# Patient Record
Sex: FEMALE | Race: WHITE | HISPANIC OR LATINO | URBAN - METROPOLITAN AREA
[De-identification: names, ages, dates, MRNs, and addresses within clinical notes are randomized per-mention and may not be internally consistent; named-entity substitution may affect disease eponyms.]

---

## 2024-01-24 VITALS
HEIGHT: 61 IN | SYSTOLIC BLOOD PRESSURE: 106 MMHG | HEART RATE: 84 BPM | WEIGHT: 141.76 LBS | OXYGEN SATURATION: 97 % | TEMPERATURE: 97 F | DIASTOLIC BLOOD PRESSURE: 66 MMHG | RESPIRATION RATE: 16 BRPM

## 2024-01-24 NOTE — ASU PREOP CHECKLIST - BOWEL PREP
How Severe Is Your Skin Lesion?: mild Has Your Skin Lesion Been Treated?: not been treated Is This A New Presentation, Or A Follow-Up?: Skin Lesion n/a

## 2024-01-25 ENCOUNTER — INPATIENT (INPATIENT)
Facility: HOSPITAL | Age: 29
LOS: 0 days | Discharge: HOME | DRG: 743 | End: 2024-01-26
Attending: OBSTETRICS & GYNECOLOGY | Admitting: OBSTETRICS & GYNECOLOGY
Payer: COMMERCIAL

## 2024-01-25 LAB
BLD GP AB SCN SERPL QL: NEGATIVE — SIGNIFICANT CHANGE UP
RH IG SCN BLD-IMP: POSITIVE — SIGNIFICANT CHANGE UP

## 2024-01-25 PROCEDURE — 88304 TISSUE EXAM BY PATHOLOGIST: CPT | Mod: 26

## 2024-01-25 PROCEDURE — 88305 TISSUE EXAM BY PATHOLOGIST: CPT | Mod: 26

## 2024-01-25 RX ORDER — ESCITALOPRAM OXALATE 10 MG/1
1 TABLET, FILM COATED ORAL
Refills: 0 | DISCHARGE

## 2024-01-25 RX ORDER — KETOROLAC TROMETHAMINE 30 MG/ML
30 SYRINGE (ML) INJECTION EVERY 6 HOURS
Refills: 0 | Status: DISCONTINUED | OUTPATIENT
Start: 2024-01-25 | End: 2024-01-26

## 2024-01-25 RX ORDER — ALBUTEROL 90 UG/1
2 AEROSOL, METERED ORAL EVERY 6 HOURS
Refills: 0 | Status: DISCONTINUED | OUTPATIENT
Start: 2024-01-25 | End: 2024-01-26

## 2024-01-25 RX ORDER — OXYCODONE HYDROCHLORIDE 5 MG/1
10 TABLET ORAL EVERY 4 HOURS
Refills: 0 | Status: DISCONTINUED | OUTPATIENT
Start: 2024-01-25 | End: 2024-01-26

## 2024-01-25 RX ORDER — ACETAMINOPHEN 500 MG
1000 TABLET ORAL ONCE
Refills: 0 | Status: COMPLETED | OUTPATIENT
Start: 2024-01-25 | End: 2024-01-25

## 2024-01-25 RX ORDER — SODIUM CHLORIDE 9 MG/ML
1000 INJECTION, SOLUTION INTRAVENOUS
Refills: 0 | Status: DISCONTINUED | OUTPATIENT
Start: 2024-01-25 | End: 2024-01-26

## 2024-01-25 RX ORDER — SIMETHICONE 80 MG/1
80 TABLET, CHEWABLE ORAL EVERY 6 HOURS
Refills: 0 | Status: DISCONTINUED | OUTPATIENT
Start: 2024-01-25 | End: 2024-01-25

## 2024-01-25 RX ORDER — PANTOPRAZOLE SODIUM 20 MG/1
20 TABLET, DELAYED RELEASE ORAL DAILY
Refills: 0 | Status: DISCONTINUED | OUTPATIENT
Start: 2024-01-25 | End: 2024-01-26

## 2024-01-25 RX ORDER — ACETAMINOPHEN 500 MG
1000 TABLET ORAL EVERY 6 HOURS
Refills: 0 | Status: DISCONTINUED | OUTPATIENT
Start: 2024-01-25 | End: 2024-01-26

## 2024-01-25 RX ORDER — METOCLOPRAMIDE HCL 10 MG
10 TABLET ORAL EVERY 8 HOURS
Refills: 0 | Status: DISCONTINUED | OUTPATIENT
Start: 2024-01-25 | End: 2024-01-26

## 2024-01-25 RX ORDER — ONDANSETRON 8 MG/1
8 TABLET, FILM COATED ORAL EVERY 8 HOURS
Refills: 0 | Status: DISCONTINUED | OUTPATIENT
Start: 2024-01-25 | End: 2024-01-26

## 2024-01-25 RX ORDER — TRAZODONE HCL 50 MG
50 TABLET ORAL AT BEDTIME
Refills: 0 | Status: DISCONTINUED | OUTPATIENT
Start: 2024-01-25 | End: 2024-01-26

## 2024-01-25 RX ORDER — SIMETHICONE 80 MG/1
80 TABLET, CHEWABLE ORAL EVERY 6 HOURS
Refills: 0 | Status: DISCONTINUED | OUTPATIENT
Start: 2024-01-25 | End: 2024-01-26

## 2024-01-25 RX ORDER — OXYCODONE HYDROCHLORIDE 5 MG/1
5 TABLET ORAL EVERY 4 HOURS
Refills: 0 | Status: DISCONTINUED | OUTPATIENT
Start: 2024-01-25 | End: 2024-01-26

## 2024-01-25 RX ORDER — ESCITALOPRAM OXALATE 10 MG/1
20 TABLET, FILM COATED ORAL DAILY
Refills: 0 | Status: DISCONTINUED | OUTPATIENT
Start: 2024-01-25 | End: 2024-01-26

## 2024-01-25 RX ORDER — HYDROMORPHONE HYDROCHLORIDE 2 MG/ML
0.2 INJECTION INTRAMUSCULAR; INTRAVENOUS; SUBCUTANEOUS
Refills: 0 | Status: DISCONTINUED | OUTPATIENT
Start: 2024-01-25 | End: 2024-01-25

## 2024-01-25 RX ORDER — SODIUM CHLORIDE 9 MG/ML
1000 INJECTION INTRAMUSCULAR; INTRAVENOUS; SUBCUTANEOUS
Refills: 0 | Status: DISCONTINUED | OUTPATIENT
Start: 2024-01-25 | End: 2024-01-25

## 2024-01-25 RX ADMIN — Medication 1000 MILLIGRAM(S): at 17:54

## 2024-01-25 RX ADMIN — Medication 50 MILLIGRAM(S): at 22:34

## 2024-01-25 RX ADMIN — PANTOPRAZOLE SODIUM 20 MILLIGRAM(S): 20 TABLET, DELAYED RELEASE ORAL at 11:58

## 2024-01-25 RX ADMIN — Medication 30 MILLIGRAM(S): at 22:34

## 2024-01-25 RX ADMIN — Medication 30 MILLIGRAM(S): at 15:46

## 2024-01-25 RX ADMIN — Medication 400 MILLIGRAM(S): at 12:25

## 2024-01-25 RX ADMIN — HYDROMORPHONE HYDROCHLORIDE 0.2 MILLIGRAM(S): 2 INJECTION INTRAMUSCULAR; INTRAVENOUS; SUBCUTANEOUS at 12:10

## 2024-01-25 RX ADMIN — SODIUM CHLORIDE 125 MILLILITER(S): 9 INJECTION, SOLUTION INTRAVENOUS at 11:58

## 2024-01-25 RX ADMIN — HYDROMORPHONE HYDROCHLORIDE 0.2 MILLIGRAM(S): 2 INJECTION INTRAMUSCULAR; INTRAVENOUS; SUBCUTANEOUS at 13:10

## 2024-01-25 RX ADMIN — HYDROMORPHONE HYDROCHLORIDE 0.2 MILLIGRAM(S): 2 INJECTION INTRAMUSCULAR; INTRAVENOUS; SUBCUTANEOUS at 12:55

## 2024-01-25 RX ADMIN — Medication 1000 MILLIGRAM(S): at 12:40

## 2024-01-25 RX ADMIN — OXYCODONE HYDROCHLORIDE 10 MILLIGRAM(S): 5 TABLET ORAL at 18:17

## 2024-01-25 RX ADMIN — HYDROMORPHONE HYDROCHLORIDE 0.2 MILLIGRAM(S): 2 INJECTION INTRAMUSCULAR; INTRAVENOUS; SUBCUTANEOUS at 11:55

## 2024-01-25 RX ADMIN — HYDROMORPHONE HYDROCHLORIDE 0.2 MILLIGRAM(S): 2 INJECTION INTRAMUSCULAR; INTRAVENOUS; SUBCUTANEOUS at 12:25

## 2024-01-25 RX ADMIN — OXYCODONE HYDROCHLORIDE 10 MILLIGRAM(S): 5 TABLET ORAL at 17:17

## 2024-01-25 RX ADMIN — SODIUM CHLORIDE 125 MILLILITER(S): 9 INJECTION, SOLUTION INTRAVENOUS at 13:48

## 2024-01-25 RX ADMIN — ESCITALOPRAM OXALATE 20 MILLIGRAM(S): 10 TABLET, FILM COATED ORAL at 17:54

## 2024-01-25 RX ADMIN — Medication 1000 MILLIGRAM(S): at 07:11

## 2024-01-25 NOTE — BRIEF OPERATIVE NOTE - NSICDXBRIEFPROCEDURE_GEN_ALL_CORE_FT
PROCEDURES:  Excision, endometriosis, laparoscopic 25-Jan-2024 11:30:57  Santi Solorzano  Bilateral ureterolysis 25-Jan-2024 11:31:17  Santi Solorzano  Hysteroscopy, with dilation and curettage 25-Jan-2024 11:31:35  Santi Solorzano

## 2024-01-25 NOTE — BRIEF OPERATIVE NOTE - OPERATION/FINDINGS
Entry with veress needle in umbilicus. Suprapubic and left lateral 5mm ports placed. Hysteroscopy D+C completed with bilateral ostia visualized, arcuate uterus. Ovaries suspended bilaterally. Bilateral ureterolysis performed. Multiple endometriosis implants noted on rectum, excised. 1x rectal oversew. Lysis of adhesions completed. Appendectomy with ligasure. 51 specimens collected and sent to pathology from pelvis. Skin closed with monocryl. Surgical pause completed, excellent hemostasis.

## 2024-01-25 NOTE — PRE-ANESTHESIA EVALUATION ADULT - NSANTHPEFT_GEN_ALL_CORE
Appearance is consistent with chronological age.   Moist mucus membranes  Unlabored breathing  Awake and alert, moves all extremities

## 2024-01-25 NOTE — PRE-ANESTHESIA EVALUATION ADULT - NSANTHPMHFT_GEN_ALL_CORE
29yo with symptomatic endometriosis and depression managed with lexapro presenting for laparoscopic excision of endometriosis, hysteroscopy and D&C.

## 2024-01-25 NOTE — DISCHARGE NOTE PROVIDER - CARE PROVIDER_API CALL
Leonila Daniel.  Obstetrics and Gynecology  [address] [place] [address], [place] [address]  Phone: [phone]  Fax: [phone]  Follow Up Time:

## 2024-01-25 NOTE — DISCHARGE NOTE PROVIDER - HOSPITAL COURSE
27 yo presented to hospital for scheduled l/s endo excision, appendectomy, hysteroscopy D+C. Patient’s postoperative course was unremarkable and she remained hemodynamically stable and afebrile throughout. Upon discharge on POD#_, the patient is ambulating and voiding spontaneously, tolerating oral intake, pain is well controlled with oral medication, and vital signs are stable.

## 2024-01-25 NOTE — ASU DISCHARGE PLAN (ADULT/PEDIATRIC) - CALL YOUR DOCTOR IF YOU HAVE ANY OF THE FOLLOWING:
Bleeding that does not stop/Pain not relieved by Medications/Fever greater than (need to indicate Fahrenheit or Celsius)/Wound/Surgical Site with redness, or foul smelling discharge or pus/Numbness, tingling, color or temperature change to extremity/Nausea and vomiting that does not stop/Unable to urinate

## 2024-01-25 NOTE — ASU DISCHARGE PLAN (ADULT/PEDIATRIC) - CARE PROVIDER_API CALL
Leonila Daniel.  Obstetrics and Gynecology  [address] [place] [address], [place] [address]  Phone: [phone]  Fax: [phone]  Follow Up Time:

## 2024-01-25 NOTE — ASU DISCHARGE PLAN (ADULT/PEDIATRIC) - ASU DC SPECIAL INSTRUCTIONSFT
- Nothing in vagina - no intercourse, tampons, or douching until cleared by your doctor.   - Avoid swimming, tub baths, and heavy lifting until cleared by your doctor.   - Showering is ok.   - Continue oral pain medications as needed for pain.    - Follow up in office in 1-2 weeks for your postoperative visit.    - Call the office sooner if you develop any fever, heavy bleeding, or severe pain.  Go to the closest emergency room for any of these symptoms if you are not able to contact your doctor.   -Take Tylenol 1000mg every 6 hours. 3 hours after taking tylenol, take first dose of Motrin 600mg (can be taken every 6 hours). Alternate between the two medications.

## 2024-01-25 NOTE — H&P ADULT - HISTORY OF PRESENT ILLNESS
27yo G0 presenting for laparoscopic endometriosis excision, hysteroscopy, D+C. Pt reports hx of severe cramps, L sided pelvic pain>R, +low back pain, +constipation, +diarrhea, +dysuria, +dysparenuia, +L sciatic pain.     Hb 12.5, Cr 0.5     OB: G0  Gyn: denies hx of abnormal pap smears, fibroids, stds.   PMH: asthma (never hospitalized or intubated), anxiety, depression  PSH: denies  Meds: albuterol prn, lexapro 20mg qd   ALL: PCN (anaphylaxis)

## 2024-01-26 VITALS
OXYGEN SATURATION: 98 % | TEMPERATURE: 98 F | SYSTOLIC BLOOD PRESSURE: 103 MMHG | HEART RATE: 80 BPM | RESPIRATION RATE: 17 BRPM | DIASTOLIC BLOOD PRESSURE: 63 MMHG

## 2024-01-26 LAB
BASOPHILS # BLD AUTO: 0.02 K/UL — SIGNIFICANT CHANGE UP (ref 0–0.2)
BASOPHILS NFR BLD AUTO: 0.2 % — SIGNIFICANT CHANGE UP (ref 0–2)
EOSINOPHIL # BLD AUTO: 0.02 K/UL — SIGNIFICANT CHANGE UP (ref 0–0.5)
EOSINOPHIL NFR BLD AUTO: 0.2 % — SIGNIFICANT CHANGE UP (ref 0–6)
HCT VFR BLD CALC: 30.4 % — LOW (ref 34.5–45)
HGB BLD-MCNC: 10.2 G/DL — LOW (ref 11.5–15.5)
IMM GRANULOCYTES NFR BLD AUTO: 0.2 % — SIGNIFICANT CHANGE UP (ref 0–0.9)
LYMPHOCYTES # BLD AUTO: 2.04 K/UL — SIGNIFICANT CHANGE UP (ref 1–3.3)
LYMPHOCYTES # BLD AUTO: 25 % — SIGNIFICANT CHANGE UP (ref 13–44)
MCHC RBC-ENTMCNC: 29.8 PG — SIGNIFICANT CHANGE UP (ref 27–34)
MCHC RBC-ENTMCNC: 33.6 GM/DL — SIGNIFICANT CHANGE UP (ref 32–36)
MCV RBC AUTO: 88.9 FL — SIGNIFICANT CHANGE UP (ref 80–100)
MONOCYTES # BLD AUTO: 0.52 K/UL — SIGNIFICANT CHANGE UP (ref 0–0.9)
MONOCYTES NFR BLD AUTO: 6.4 % — SIGNIFICANT CHANGE UP (ref 2–14)
NEUTROPHILS # BLD AUTO: 5.54 K/UL — SIGNIFICANT CHANGE UP (ref 1.8–7.4)
NEUTROPHILS NFR BLD AUTO: 68 % — SIGNIFICANT CHANGE UP (ref 43–77)
NRBC # BLD: 0 /100 WBCS — SIGNIFICANT CHANGE UP (ref 0–0)
PLATELET # BLD AUTO: 256 K/UL — SIGNIFICANT CHANGE UP (ref 150–400)
RBC # BLD: 3.42 M/UL — LOW (ref 3.8–5.2)
RBC # FLD: 12.7 % — SIGNIFICANT CHANGE UP (ref 10.3–14.5)
WBC # BLD: 8.16 K/UL — SIGNIFICANT CHANGE UP (ref 3.8–10.5)
WBC # FLD AUTO: 8.16 K/UL — SIGNIFICANT CHANGE UP (ref 3.8–10.5)

## 2024-01-26 PROCEDURE — 88304 TISSUE EXAM BY PATHOLOGIST: CPT

## 2024-01-26 PROCEDURE — 86901 BLOOD TYPING SEROLOGIC RH(D): CPT

## 2024-01-26 PROCEDURE — 85025 COMPLETE CBC W/AUTO DIFF WBC: CPT

## 2024-01-26 PROCEDURE — 88305 TISSUE EXAM BY PATHOLOGIST: CPT

## 2024-01-26 PROCEDURE — 86900 BLOOD TYPING SEROLOGIC ABO: CPT

## 2024-01-26 PROCEDURE — 86850 RBC ANTIBODY SCREEN: CPT

## 2024-01-26 PROCEDURE — 36415 COLL VENOUS BLD VENIPUNCTURE: CPT

## 2024-01-26 RX ORDER — METHOCARBAMOL 500 MG/1
2 TABLET, FILM COATED ORAL
Qty: 15 | Refills: 0
Start: 2024-01-26

## 2024-01-26 RX ADMIN — SIMETHICONE 80 MILLIGRAM(S): 80 TABLET, CHEWABLE ORAL at 07:07

## 2024-01-26 RX ADMIN — Medication 1000 MILLIGRAM(S): at 00:17

## 2024-01-26 RX ADMIN — SIMETHICONE 80 MILLIGRAM(S): 80 TABLET, CHEWABLE ORAL at 00:17

## 2024-01-26 RX ADMIN — Medication 30 MILLIGRAM(S): at 04:11

## 2024-01-26 RX ADMIN — SIMETHICONE 80 MILLIGRAM(S): 80 TABLET, CHEWABLE ORAL at 12:55

## 2024-01-26 NOTE — PROGRESS NOTE ADULT - SUBJECTIVE AND OBJECTIVE BOX
27yo P0 s/p l/s endo excision, appy, b/l ovarian suspensions, TONO, rectal oversew x 1, hysteroscopy D+C. Pt seen and examined at bedside. Pt complains of mild abdominal pain.  Pt denies any fever, chills, chest pain, SOB, nausea or vomiting     T(F): 97.7 (01-25-24 @ 13:41), Max: 97.7 (01-25-24 @ 13:41)  HR: 102 (01-25-24 @ 13:41) (84 - 109)  BP: 109/67 (01-25-24 @ 13:41) (106/66 - 130/65)  RR: 16 (01-25-24 @ 13:41) (13 - 22)  SpO2: 99% (01-25-24 @ 13:41) (97% - 100%)       01-25 @ 07:01  -  01-25 @ 15:07  --------------------------------------------------------  IN: 2200 mL / OUT: 1070 mL / NET: 1130 mL        acetaminophen     Tablet .. 1000 milliGRAM(s) Oral every 6 hours  albuterol    90 MICROgram(s) HFA Inhaler 2 Puff(s) Inhalation every 6 hours PRN Bronchospasm  escitalopram 20 milliGRAM(s) Oral daily  ketorolac   Injectable 30 milliGRAM(s) IV Push every 6 hours  lactated ringers. 1000 milliLiter(s) IV Continuous <Continuous>  metoclopramide Injectable 10 milliGRAM(s) IV Push every 8 hours PRN Nausea and/or Vomiting  ondansetron Injectable 8 milliGRAM(s) IV Push every 8 hours PRN Nausea and/or Vomiting  oxyCODONE    IR 5 milliGRAM(s) Oral every 4 hours PRN Moderate Pain (4 - 6)  oxyCODONE    IR 10 milliGRAM(s) Oral every 4 hours PRN Severe Pain (7 - 10)  pantoprazole  Injectable 20 milliGRAM(s) IV Push daily  simethicone 80 milliGRAM(s) Chew every 6 hours PRN Gas      Physical exam:  Constitutional: NAD  Pulmonary: clear to auscultation bilaterally  Cardiovascular: regular rate and rhythm  Abdomen: incision site clean, dry and intact. Soft, mildly tender, nondistended ,b/l suspensions  Extremities: no lower extremity edema, or calve tenderness. SCDs in place    A: POD0 doing well     Plan: admitted for post op monitoring and pain control   Neuro: Depression. Toradol and Tylenol standing, Oxycodone prn, Dilaudid for breakthrough  Cardio: vital signs stable, continue to monitor per protocol  Pulm: Asthma incentive spirometer at least 10 times per hour while awake   GI:    Reglan, Zofran prn, Simethicone, Protonix   : Mejia catheter til tomorrow am   Follow up CBC in AM   DVT ppx: SCDs   Activity: bedrest til tomorrow AM when suspensions are removed  
Pt seen and examined at bedside. Pt states mild abdominal pain. Pt is not yet ambulating, passing flatus. She is tolerating reg diet and urinating adequately. Mejia removed this AM.  Pt denies fever, chills, chest pain, SOB, nausea, vomiting, lightheadedness, dizziness.      T(F): 97.8 (01-26-24 @ 05:29), Max: 98.2 (01-25-24 @ 23:59)  HR: 78 (01-26-24 @ 05:29) (78 - 115)  BP: 90/48 (01-26-24 @ 05:29) (90/48 - 130/65)  RR: 18 (01-26-24 @ 05:29) (13 - 22)  SpO2: 96% (01-26-24 @ 05:29) (96% - 100%)  I&O's Summary    25 Jan 2024 07:01  -  26 Jan 2024 07:00  --------------------------------------------------------  IN: 3555 mL / OUT: 5045 mL / NET: -1490 mL        MEDICATIONS  (STANDING):  acetaminophen     Tablet .. 1000 milliGRAM(s) Oral every 6 hours  escitalopram 20 milliGRAM(s) Oral daily  ketorolac   Injectable 30 milliGRAM(s) IV Push every 6 hours  pantoprazole  Injectable 20 milliGRAM(s) IV Push daily  simethicone 80 milliGRAM(s) Chew every 6 hours  traZODone 50 milliGRAM(s) Oral at bedtime    MEDICATIONS  (PRN):  albuterol    90 MICROgram(s) HFA Inhaler 2 Puff(s) Inhalation every 6 hours PRN Bronchospasm  metoclopramide Injectable 10 milliGRAM(s) IV Push every 8 hours PRN Nausea and/or Vomiting  ondansetron Injectable 8 milliGRAM(s) IV Push every 8 hours PRN Nausea and/or Vomiting  oxyCODONE    IR 5 milliGRAM(s) Oral every 4 hours PRN Moderate Pain (4 - 6)  oxyCODONE    IR 10 milliGRAM(s) Oral every 4 hours PRN Severe Pain (7 - 10)      Physical Exam:  Constitutional: NAD  Pulmonary: no increased work of breathing  Cardiovascular: Regular rate and rhythm   Abdomen: incision sites clean, dry, intact. Soft, mildly tender, mildly distended, no guarding, no rebound, + bowel sounds  Extremities: no lower extremity edema or calf tenderness. SCDs in place

## 2024-01-26 NOTE — DISCHARGE NOTE NURSING/CASE MANAGEMENT/SOCIAL WORK - PATIENT PORTAL LINK FT
You can access the FollowMyHealth Patient Portal offered by [facility] by registering at the following website: [email or web address]. By joining [employer]’s FollowMyHealth portal, you will also be able to view your health information using other applications (apps) compatible with our system.

## 2024-01-26 NOTE — PROGRESS NOTE ADULT - ASSESSMENT
29yo G0 POD1 s/p laparoscopic endometriosis excision, appendectomy, ureterolysis, rectal oversew x 1, hysteroscopy, D+C.     Neuro: tylenol/toradol atc, oxy prn, lexapro 20mg qhs, trazodone 50 qhs  Pulm: RA, asthma - albuterol prn  CV: VSS, heplocked  GI: LFD, zofran/reglan prn, protonix qd, simethicone atc  : s/p wall  GYN: s/p l/s endo excision, b/l suspensions removed  DVT ppx: SCDs    [] f/u AM cbc  [] TOV 1600

## 2024-02-01 LAB — SURGICAL PATHOLOGY STUDY: SIGNIFICANT CHANGE UP

## 2024-02-07 DIAGNOSIS — F41.9 ANXIETY DISORDER, UNSPECIFIED: ICD-10-CM

## 2024-02-07 DIAGNOSIS — J45.909 UNSPECIFIED ASTHMA, UNCOMPLICATED: ICD-10-CM

## 2024-02-07 DIAGNOSIS — R10.2 PELVIC AND PERINEAL PAIN: ICD-10-CM

## 2024-02-07 DIAGNOSIS — Z88.0 ALLERGY STATUS TO PENICILLIN: ICD-10-CM

## 2024-02-07 DIAGNOSIS — G89.29 OTHER CHRONIC PAIN: ICD-10-CM

## 2024-02-07 DIAGNOSIS — N94.12 DEEP DYSPAREUNIA: ICD-10-CM

## 2024-02-07 DIAGNOSIS — N80.00 ENDOMETRIOSIS OF THE UTERUS, UNSPECIFIED: ICD-10-CM

## 2024-02-07 DIAGNOSIS — N80.519 ENDOMETRIOSIS OF THE RECTUM, UNSPECIFIED DEPTH: ICD-10-CM

## 2024-02-07 DIAGNOSIS — F41.8 OTHER SPECIFIED ANXIETY DISORDERS: ICD-10-CM

## 2024-02-07 DIAGNOSIS — N94.6 DYSMENORRHEA, UNSPECIFIED: ICD-10-CM

## 2024-02-07 DIAGNOSIS — N80.A63 ENDOMETRIOSIS OF BILATERAL URETERS, UNSPECIFIED DEPTH: ICD-10-CM

## 2024-02-07 DIAGNOSIS — F32.A DEPRESSION, UNSPECIFIED: ICD-10-CM

## 2024-02-07 DIAGNOSIS — N80.559: ICD-10-CM

## 2024-08-19 ENCOUNTER — EMERGENCY (EMERGENCY)
Facility: HOSPITAL | Age: 29
LOS: 1 days | Discharge: HOME | End: 2024-08-19
Attending: STUDENT IN AN ORGANIZED HEALTH CARE EDUCATION/TRAINING PROGRAM | Admitting: STUDENT IN AN ORGANIZED HEALTH CARE EDUCATION/TRAINING PROGRAM
Payer: COMMERCIAL

## 2024-08-19 VITALS
DIASTOLIC BLOOD PRESSURE: 74 MMHG | RESPIRATION RATE: 18 BRPM | WEIGHT: 139.99 LBS | OXYGEN SATURATION: 97 % | SYSTOLIC BLOOD PRESSURE: 108 MMHG | HEART RATE: 90 BPM | TEMPERATURE: 98 F

## 2024-08-19 DIAGNOSIS — N80.9 ENDOMETRIOSIS, UNSPECIFIED: ICD-10-CM

## 2024-08-19 DIAGNOSIS — Z87.42 PERSONAL HISTORY OF OTHER DISEASES OF THE FEMALE GENITAL TRACT: ICD-10-CM

## 2024-08-19 DIAGNOSIS — G89.29 OTHER CHRONIC PAIN: ICD-10-CM

## 2024-08-19 DIAGNOSIS — R10.32 LEFT LOWER QUADRANT PAIN: ICD-10-CM

## 2024-08-19 DIAGNOSIS — N93.9 ABNORMAL UTERINE AND VAGINAL BLEEDING, UNSPECIFIED: ICD-10-CM

## 2024-08-19 DIAGNOSIS — R11.2 NAUSEA WITH VOMITING, UNSPECIFIED: ICD-10-CM

## 2024-08-19 DIAGNOSIS — R53.83 OTHER FATIGUE: ICD-10-CM

## 2024-08-19 DIAGNOSIS — Z88.0 ALLERGY STATUS TO PENICILLIN: ICD-10-CM

## 2024-08-19 DIAGNOSIS — R10.2 PELVIC AND PERINEAL PAIN: ICD-10-CM

## 2024-08-19 PROBLEM — F41.9 ANXIETY DISORDER, UNSPECIFIED: Chronic | Status: ACTIVE | Noted: 2024-01-24

## 2024-08-19 LAB
ANION GAP SERPL CALC-SCNC: 8 MMOL/L — SIGNIFICANT CHANGE UP (ref 5–17)
BASOPHILS # BLD AUTO: 0.05 K/UL — SIGNIFICANT CHANGE UP (ref 0–0.2)
BASOPHILS NFR BLD AUTO: 0.9 % — SIGNIFICANT CHANGE UP (ref 0–2)
BLD GP AB SCN SERPL QL: NEGATIVE — SIGNIFICANT CHANGE UP
BUN SERPL-MCNC: 9 MG/DL — SIGNIFICANT CHANGE UP (ref 7–23)
CALCIUM SERPL-MCNC: 9.3 MG/DL — SIGNIFICANT CHANGE UP (ref 8.4–10.5)
CHLORIDE SERPL-SCNC: 105 MMOL/L — SIGNIFICANT CHANGE UP (ref 96–108)
CO2 SERPL-SCNC: 26 MMOL/L — SIGNIFICANT CHANGE UP (ref 22–31)
CREAT SERPL-MCNC: 0.65 MG/DL — SIGNIFICANT CHANGE UP (ref 0.5–1.3)
EGFR: 121 ML/MIN/1.73M2 — SIGNIFICANT CHANGE UP
EOSINOPHIL # BLD AUTO: 0.1 K/UL — SIGNIFICANT CHANGE UP (ref 0–0.5)
EOSINOPHIL NFR BLD AUTO: 1.7 % — SIGNIFICANT CHANGE UP (ref 0–6)
GLUCOSE SERPL-MCNC: 136 MG/DL — HIGH (ref 70–99)
HCG SERPL-ACNC: <1 MIU/ML — SIGNIFICANT CHANGE UP
HCT VFR BLD CALC: 32.8 % — LOW (ref 34.5–45)
HGB BLD-MCNC: 10.9 G/DL — LOW (ref 11.5–15.5)
IMM GRANULOCYTES NFR BLD AUTO: 0.3 % — SIGNIFICANT CHANGE UP (ref 0–0.9)
LYMPHOCYTES # BLD AUTO: 2.36 K/UL — SIGNIFICANT CHANGE UP (ref 1–3.3)
LYMPHOCYTES # BLD AUTO: 40.8 % — SIGNIFICANT CHANGE UP (ref 13–44)
MCHC RBC-ENTMCNC: 28.5 PG — SIGNIFICANT CHANGE UP (ref 27–34)
MCHC RBC-ENTMCNC: 33.2 GM/DL — SIGNIFICANT CHANGE UP (ref 32–36)
MCV RBC AUTO: 85.9 FL — SIGNIFICANT CHANGE UP (ref 80–100)
MONOCYTES # BLD AUTO: 0.22 K/UL — SIGNIFICANT CHANGE UP (ref 0–0.9)
MONOCYTES NFR BLD AUTO: 3.8 % — SIGNIFICANT CHANGE UP (ref 2–14)
NEUTROPHILS # BLD AUTO: 3.03 K/UL — SIGNIFICANT CHANGE UP (ref 1.8–7.4)
NEUTROPHILS NFR BLD AUTO: 52.5 % — SIGNIFICANT CHANGE UP (ref 43–77)
NRBC # BLD: 0 /100 WBCS — SIGNIFICANT CHANGE UP (ref 0–0)
PLATELET # BLD AUTO: 333 K/UL — SIGNIFICANT CHANGE UP (ref 150–400)
POTASSIUM SERPL-MCNC: 4.4 MMOL/L — SIGNIFICANT CHANGE UP (ref 3.5–5.3)
POTASSIUM SERPL-SCNC: 4.4 MMOL/L — SIGNIFICANT CHANGE UP (ref 3.5–5.3)
RBC # BLD: 3.82 M/UL — SIGNIFICANT CHANGE UP (ref 3.8–5.2)
RBC # FLD: 12.3 % — SIGNIFICANT CHANGE UP (ref 10.3–14.5)
RH IG SCN BLD-IMP: POSITIVE — SIGNIFICANT CHANGE UP
SODIUM SERPL-SCNC: 139 MMOL/L — SIGNIFICANT CHANGE UP (ref 135–145)
WBC # BLD: 5.78 K/UL — SIGNIFICANT CHANGE UP (ref 3.8–10.5)
WBC # FLD AUTO: 5.78 K/UL — SIGNIFICANT CHANGE UP (ref 3.8–10.5)

## 2024-08-19 PROCEDURE — 74177 CT ABD & PELVIS W/CONTRAST: CPT | Mod: 26,MC

## 2024-08-19 PROCEDURE — 99285 EMERGENCY DEPT VISIT HI MDM: CPT

## 2024-08-19 PROCEDURE — 76856 US EXAM PELVIC COMPLETE: CPT | Mod: 26

## 2024-08-19 PROCEDURE — 76830 TRANSVAGINAL US NON-OB: CPT | Mod: 26

## 2024-08-19 RX ORDER — METOCLOPRAMIDE HCL 5 MG/ML
10 VIAL (ML) INJECTION ONCE
Refills: 0 | Status: COMPLETED | OUTPATIENT
Start: 2024-08-19 | End: 2024-08-19

## 2024-08-19 RX ORDER — KETOROLAC TROMETHAMINE 10 MG
15 TABLET ORAL ONCE
Refills: 0 | Status: DISCONTINUED | OUTPATIENT
Start: 2024-08-19 | End: 2024-08-19

## 2024-08-19 RX ORDER — ONDANSETRON HCL/PF 4 MG/2 ML
4 VIAL (ML) INJECTION ONCE
Refills: 0 | Status: COMPLETED | OUTPATIENT
Start: 2024-08-19 | End: 2024-08-19

## 2024-08-19 RX ORDER — FAMOTIDINE 40 MG/1
20 TABLET, FILM COATED ORAL ONCE
Refills: 0 | Status: COMPLETED | OUTPATIENT
Start: 2024-08-19 | End: 2024-08-19

## 2024-08-19 RX ORDER — BACTERIOSTATIC SODIUM CHLORIDE 0.9 %
1000 VIAL (ML) INJECTION ONCE
Refills: 0 | Status: COMPLETED | OUTPATIENT
Start: 2024-08-19 | End: 2024-08-19

## 2024-08-19 RX ADMIN — Medication 4 MILLIGRAM(S): at 20:58

## 2024-08-19 RX ADMIN — Medication 4 MILLIGRAM(S): at 17:44

## 2024-08-19 RX ADMIN — Medication 4 MILLIGRAM(S): at 17:24

## 2024-08-19 RX ADMIN — Medication 1000 MILLILITER(S): at 21:20

## 2024-08-19 RX ADMIN — Medication 15 MILLIGRAM(S): at 17:24

## 2024-08-19 RX ADMIN — Medication 10 MILLIGRAM(S): at 21:20

## 2024-08-19 RX ADMIN — FAMOTIDINE 20 MILLIGRAM(S): 40 TABLET, FILM COATED ORAL at 21:21

## 2024-08-19 NOTE — ED ADULT TRIAGE NOTE - CHIEF COMPLAINT QUOTE
pt repots being on her periodx 1 week complaining of pelvic pain and cramping x 1 week with nausea no vomiting went to urg care given rx for zofran and taking midol with minimal relief

## 2024-08-19 NOTE — ED PROVIDER NOTE - PROGRESS NOTE DETAILS
pablo - received on sign out pending CT imaging, dispo.   BP low to 90s systolic. ordered for 2nd L fluids.   CT w/o acute findings.   on reeval pt feeling better, abd pain improved, mild cramping (given toradol, due for nsaids again). rpt abd exam benign. tolerating po. after 2nd liter of fluids BP still 90s systolic. feels dehydrated and has not urinated since her 2L fluids. given saltines and another 500cc fluids, and BP improved to 100s systolic. asymptomatic. not lightheaded. ambulatory through ed and feels well. feels comfortable w dc. bleeding has significantly slowed.    All results reviewed with the patient verbally. Discharge plan and return precautions d/w pt who verbalized understanding and agrees with plan. All questions answered. Vitals WNL. Ready for d/c.

## 2024-08-19 NOTE — ED ADULT NURSE NOTE - OBJECTIVE STATEMENT
Patient presents to ER AOx4 speaking in full clear sentences c/o heavy menstrual bleeding and abd pain. pt reports using 1 pad q20 mins yesterday lessening today to b0tecno. pt w/pmhx ovarian cyst, endometriosis

## 2024-08-19 NOTE — ED PROVIDER NOTE - OBJECTIVE STATEMENT
29 F pmh endometriosis, ovarian cyst p/w vagnal bleeding and pelvic pain x one week.  pt w/ intermittent vaginal bleeding x one month, worse the past week.  changing pad every 20 minutes yesterday, now every 2 hours w/ clots.  + nausea and fatigue.  sharp lower pelvic pain, mild relief w/ midol.  denies f/c, HA, dizziness, fainting, chest pain, sob, palpitations, diarrhea, constipation, urinary sxs, vaginal dc, trauma

## 2024-08-19 NOTE — ED PROVIDER NOTE - PATIENT PORTAL LINK FT
You can access the FollowMyHealth Patient Portal offered by [facility] by registering at the following website: [email or web address]. By joining [employer]’s FollowMyHealth portal, you will also be able to view your health information using other applications (apps) compatible with our system.

## 2024-08-19 NOTE — CONSULT NOTE ADULT - SUBJECTIVE AND OBJECTIVE BOX
28yo G0 presents to the ER complaining of heavy VB, n/v and abdominal pain x1 week.  Pt states last week she was going through a pad every 20 minutes and had severe nausea/vomiting and was unable to keep down a meal.  Pt states on Saturday morning she went to urgent care where they gave her zofran prn for nausea/vomiting which was helpful in letting pt tolerate meals. Pt states since sunday her bleeding has improved and she has been going through 1 pad every 2 hours but not soaking through.  Pt states she is no longer vomiting but still is nauseous and eating ok.  Pt states she has abdominal pain now that is located in her LLQ which is extremely painful at time then completely subsides. Pt currently does not have pain.  Pt is unable to have sexual intercourse due to chronic pelvic pain over the last month.  Pt denies fever, chills, chest pain, SOB.       OB/GYN Hx: G0, L ovarian cyst (in past?)   Last PAP smear: 1 year ago wnl   LMP 3/2024? unsure   PMHx: anxiety   SHx: l/s endo ex, hysteroscopy D&C, 1/25/24 w/ Dr Daniel  Meds: adderall 10mg prn, xanax 0.5mg prn, cymbalta 60mg qd,  zofran prn   Allergies: NKDA    Social hx:  at bedside, has not had intercourse in over 1 month,     PHYSICAL EXAM:   Vital Signs Last 24 Hrs  T(C): 36.9 (19 Aug 2024 16:39), Max: 36.9 (19 Aug 2024 16:39)  T(F): 98.5 (19 Aug 2024 16:39), Max: 98.5 (19 Aug 2024 16:39)  HR: 90 (19 Aug 2024 16:39) (90 - 90)  BP: 108/74 (19 Aug 2024 16:39) (108/74 - 108/74)  BP(mean): --  RR: 18 (19 Aug 2024 16:39) (18 - 18)  SpO2: 97% (19 Aug 2024 16:39) (97% - 97%)    Parameters below as of 19 Aug 2024 16:39  Patient On (Oxygen Delivery Method): room air        **************************  Constitutional: Alert & Oriented x3, No acute distress  Respiratory: Clear to ausculation bilaterally; no wheezing, rhonchi, or crackles  Cardiovascular: regular rate and rhythm, no murmurs, or gallops  Gastrointestinal: soft, non tender, positive bowel sounds, no rebound or guarding , some pain over LLQ. overall benign   Pelvic exam: no vaginal bleeding seen, no active bleeding from cervix. 1cc of brown discharge in vaginal vault.  Extremities: no calf tenderness or swelling    LABS:                        10.9   5.78  )-----------( 333      ( 19 Aug 2024 17:15 )             32.8     08-19    139  |  105  |  9   ----------------------------<  136<H>  4.4   |  26  |  0.65    Ca    9.3      19 Aug 2024 17:15        Urinalysis Basic - ( 19 Aug 2024 17:15 )    Color: x / Appearance: x / SG: x / pH: x  Gluc: 136 mg/dL / Ketone: x  / Bili: x / Urobili: x   Blood: x / Protein: x / Nitrite: x   Leuk Esterase: x / RBC: x / WBC x   Sq Epi: x / Non Sq Epi: x / Bacteria: x          RADIOLOGY & ADDITIONAL STUDIES: 30yo G0 presents to the ER complaining of heavy VB, n/v and abdominal pain x1 week.  Pt states last week she was going through a pad every 20 minutes and had severe nausea/vomiting and was unable to keep down a meal.  Pt states on Saturday morning she went to urgent care where they gave her zofran prn for nausea/vomiting which was helpful in letting pt tolerate meals. Pt states since sunday her bleeding has improved and she has been going through 1 pad every 2 hours but not soaking through.  Pt states she is no longer vomiting but still is nauseous and eating ok.  Pt states she has abdominal pain now that is located in her LLQ which is extremely painful at time then completely subsides. Pt currently does not have pain.  Pt is unable to have sexual intercourse due to chronic pelvic pain over the last month.  Pt denies fever, chills, chest pain, SOB.       OB/GYN Hx: G0, L ovarian cyst (in past?)   Last PAP smear: 1 year ago wnl   LMP 3/2024? unsure   PMHx: anxiety   SHx: l/s endo ex, hysteroscopy D&C, 1/25/24 w/ Dr Daniel  Meds: adderall 10mg prn, xanax 0.5mg prn, cymbalta 60mg qd,  zofran prn   Allergies: NKDA    Social hx:  at bedside, has not had intercourse in over 1 month,     PHYSICAL EXAM:   Vital Signs Last 24 Hrs  T(C): 36.9 (19 Aug 2024 16:39), Max: 36.9 (19 Aug 2024 16:39)  T(F): 98.5 (19 Aug 2024 16:39), Max: 98.5 (19 Aug 2024 16:39)  HR: 90 (19 Aug 2024 16:39) (90 - 90)  BP: 108/74 (19 Aug 2024 16:39) (108/74 - 108/74)  BP(mean): --  RR: 18 (19 Aug 2024 16:39) (18 - 18)  SpO2: 97% (19 Aug 2024 16:39) (97% - 97%)    Parameters below as of 19 Aug 2024 16:39  Patient On (Oxygen Delivery Method): room air        **************************  Constitutional: Alert & Oriented x3, No acute distress  Respiratory: Clear to ausculation bilaterally; no wheezing, rhonchi, or crackles  Cardiovascular: regular rate and rhythm, no murmurs, or gallops  Gastrointestinal: soft, non tender, positive bowel sounds, no rebound or guarding , some pain over LLQ. overall benign   Pelvic exam: no vaginal bleeding seen, no active bleeding from cervix. 1cc of brown discharge in vaginal vault.  Extremities: no calf tenderness or swelling    LABS:                        10.9   5.78  )-----------( 333      ( 19 Aug 2024 17:15 )             32.8     08-19    139  |  105  |  9   ----------------------------<  136<H>  4.4   |  26  |  0.65    Ca    9.3      19 Aug 2024 17:15        Urinalysis Basic - ( 19 Aug 2024 17:15 )    Color: x / Appearance: x / SG: x / pH: x  Gluc: 136 mg/dL / Ketone: x  / Bili: x / Urobili: x   Blood: x / Protein: x / Nitrite: x   Leuk Esterase: x / RBC: x / WBC x   Sq Epi: x / Non Sq Epi: x / Bacteria: x          RADIOLOGY & ADDITIONAL STUDIES:  < from: US Transvaginal (08.19.24 @ 20:13) >  ACC: [id number] EXAM:  US TRANSVAGINAL   ORDERED BY: ZENY FAIR     PROCEDURE DATE:  08/19/2024          INTERPRETATION:  CLINICAL INFORMATION: Menometrorrhagia with history of   endometriosis and ovarian cysts    LMP: 8/9/2014    COMPARISON: None available.    TECHNIQUE:  Endovaginal and transabdominal pelvic sonogram. Color and Spectral   Doppler was performed.    FINDINGS:  Uterus: 8.5 x 3.9 x 5.2 cm. Within normal limits.  Endometrium: 7 mm. Within normal limits. There is divergence of the   endometrial stripe raising possibility of mullerian duct abnormality such   as septate uterus. There is no contour defect in the myometrium to   suggest bicornuate uterus.      Right ovary: 3.8 x 1.9 x 0.4 cm. Within normal limits. Normal arterial   and venous waveforms.  Left ovary: 2.1 x 2.7 x 2.1 cm. Within normal limits. Normal arterial and   venous waveforms.    Fluid: None.    IMPRESSION:  No evidence of endometrial thickening or fibroids. Question of septate   uterus.    --- End of Report ---            TUNDE OLSON MD; Attending Radiologist  This document has been electronically signed. Aug 19 2024  8:24PM    < end of copied text >

## 2024-08-19 NOTE — ED ADULT NURSE NOTE - NSICDXPASTMEDICALHX_GEN_ALL_CORE_FT
Left message for patient to contact office to discuss.     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 COngrats. Let her know please    Cardiac MRI 8/11/17    IMPRESSION:  1.  Normal left ventricular volume and wall thickness with mild global  hypokinesis, LVEF 50%. Systolic function is mildly improved compared to MRI  from 2014.  2.  Myocardial noncompaction. Fragmented appearance of the papillary  muscles with prominent trabeculation, noncompaction ratio 3:1.  3.  No myocardial fibrosis or edema.  4.  RVEF 57%.   PAST MEDICAL HISTORY:  Anxiety and depression     Endometriosis

## 2024-08-19 NOTE — ED PROVIDER NOTE - ATTENDING APP SHARED VISIT CONTRIBUTION OF CARE
29 F pmh endometriosis, ovarian cyst , PSH of exploration of endometriosis 6 m/a p/w vagnal bleeding and pelvic pain x one week and N/V.  pt w/ intermittent vaginal bleeding x one month, worse the last week.  Denies associated fever, chills, congestion, cough, chest pain, shortness of breath, diarrhea, dysuria, hematuria, leg edema, rash.    On exam, bp 108/74, VS otherwise wnl,   GENERAL:  Awake, alert and in mild distress, vomiting, non-toxic appearing  ENMT: Airway patent  EYES: conjunctiva clear  CARDIAC: Regular rate, regular rhythm.  Heart sounds S1, S2, no S3, S4. No murmurs, rubs or gallops.  RESPIRATORY: Breath sounds clear and equal in bilateral anterior lung fields, no wheezes/ronchi/crackles/stridor; pt breathing and speaking comfortably with no increased WOB, no accessory mm. use, no intercostal retractions, no nasal flaring  GI: abdomen soft, non-distended, non-tender, no rebound or guarding in ruq/luq/rlq/llq/epigastrium, no cvat bilat   SKIN: warm and dry, no rashes  PSYCH: awake, alert, calm and cooperative  EXTREMITIES: no ble edema  NEURO: gcs 15    ddx: endometriosis vs menstrual pain vs pregnancy vs uti    Plan:  - cbc shows normal wbc, h/h 10/32, bmp unremarkable, hcg neg  - us: INTERPRETATION:  Impression: Please see combined report of transabdominal   and transvaginal ultrasound  - toradol, morphine, zofran  - pt vomiting, will give meds/ivf, ctap

## 2024-08-19 NOTE — CONSULT NOTE ADULT - ASSESSMENT
28yo G0 presents with 1 week of Heavy VB, N/V, abdominal pain.   -Pt has hx of endometriosis, is now l/s endo excision in January with Dr Daniel   -VSS, abdominal exam is benign, there is no bleeding on speculum exam. Pt is stable  -Please obtain TVUS, labs   -Dr. Daniel aware of pt and in agreement  with plan  30yo G0 presents with 1 week of Heavy VB, N/V, abdominal pain.   -Pt has hx of endometriosis, is now l/s endo excision in January with Dr Daniel   -VSS, abdominal exam is benign, there is no bleeding on speculum exam. Pt is stable  -Please obtain TVUS, labs   -Dr. Daniel aware of pt and in agreement  with plan     Addendum: Night team assuming care of patient. TVUS shows no acute GYN pathology. No ovarian cysts, fluid in cul de sac; blood flow to bilateral ovaries intact, no concern for torsion. Labs wnl, Hb 10.9 (10.2 01/2024).   - Hemodynamically stable, afebrile   - Patient no longer has heavy vaginal bleeding  - abdominal exam benign  - Vitals, labs, and TVUS wnl  - No acute GYN intervention  - GYN signing off at this time  - Would defer any remaining workup of abdominal pain to ED provider    Bay Quintana PGY2  d/w Dr. Tobar PGY4 and attending physician Dr. Daniel attending physician  30yo G0 presents with 1 week of Heavy VB, N/V, abdominal pain.   -Pt has hx of endometriosis, is now l/s endo excision in January with Dr Daniel   -VSS, abdominal exam is benign, there is no bleeding on speculum exam. Pt is stable  -Please obtain TVUS, labs   -Dr. Daniel aware of pt and in agreement  with plan     Addendum: Night team assuming care of patient. TVUS shows no acute GYN pathology. No ovarian cysts, fluid in cul de sac; blood flow to bilateral ovaries intact, no concern for torsion. Labs wnl, Hb 10.9 (10.2 01/2024).   - Hemodynamically stable, afebrile   - Patient no longer has heavy vaginal bleeding  - abdominal exam benign  - Vitals, labs, and TVUS wnl  - No acute GYN intervention  - GYN signing off at this time  - Would defer any remaining workup of abdominal pain to ED provider    Bay Quintana PGY2  d/w Dr. Tobar PGY4 and attending physician Dr. Daniel attending physician     Addendum #2: CTAP ordered by ED provider shows no acute pathology.

## 2024-08-19 NOTE — ED PROVIDER NOTE - PHYSICAL EXAMINATION
Vitals reviewed  Gen: comfortable appearing, nad, speaking in full sentences  Skin: wwp, no rash/lesions  HEENT: ncat, eomi, mmm, airway patent   CV: rrr, no audible m/r/g  Resp: symmetrical expansion, ctab, no w/r/r  Abd: nondistended, soft, mild diffuse lower abd ttp, no r/g, no cvat  pelvic deferred to gyn   Ext: FROM throughout, no peripheral edema, no muscle or joint ttp, distal pulses 2+  Neuro: alert/oriented, no focal deficits, steady gait

## 2024-08-19 NOTE — ED PROVIDER NOTE - NSFOLLOWUPINSTRUCTIONS_ED_ALL_ED_FT
Take tylenol / motrin for pain.     Follow up with your GYN provider within 1-2 weeks for continued management.       RETURN PRECAUTIONS - In the meantime, if you start to develop abdominal pain or any heavy severe vaginal bleeding (more than 4 pads in one hour) return to the ED immediately. Additionally if you develop any symptoms of anemia (if you pass out or lose consciousness, have palpitations, chest pain, or shortness of breath, syncope) return to the ED immediately. In general, return to this emergency department if you have persistent, worsening or new symptoms such as severe abdominal pain or cramping, you develop a fever, soak through more than 4 pads per hour, become lightheaded/weak/pass out, develop chest pain or shortness of breath, or any other concerns.

## 2024-08-19 NOTE — ED PROVIDER NOTE - CLINICAL SUMMARY MEDICAL DECISION MAKING FREE TEXT BOX
29 F pmh endometriosis, ovarian cyst p/w vaginal bleeding and pelvic pain x one week.  on exam vss, afebrile, comfortable appearing, Abd: nondistended, soft, mild diffuse lower abd ttp, no r/g, no cvat; pelvic deferred to gyn.  ?endometriosis vs ovarian cyst vs r/o preg.  will obtain labs, preg, sonogram, give analgesia.  gyn aware

## 2024-08-19 NOTE — ED ADULT NURSE NOTE - NS ED NURSE LEVEL OF CONSCIOUSNESS MENTAL STATUS
Assessment & Plan     Secondary amenorrhea  LMP in late October, previously regular menses even during regular breastfeeding. Now breastfeeding small amounts at night. Pregnancy test is negative today. BMI is normal, no recent weight changes. Continued stress at home. No clear symptoms of early menopause. Patient preference to do a work-up.   - Follicle stimulating hormone; Future  - TSH with free T4 reflex; Future  - Prolactin; Future  - Estradiol; Future  - HCG qualitative urine: Negative  - If normal, can return to clinic for further work-up      No follow-ups on file.    Saundra Garcia MD  [facility] [place]  Secondary amenorrhea   3 months or more    Kailash Zabala is a 41 year old who presents for the following health issues     LMP late October.  is now in the area from [place] though not able to live with her and their child due to restrictions of her apartment. This has caused a lot of stress as she is still raising their child on her own and has to travel to see him. Additional stress of him adjusting to the local culture. Continues to struggle with the stress of where she lives. She is sleeping better recently. For the past week she felt as though she would have her period due to typical pelvic discomfort and low back pain, though no menses. No hot flashes. Is breastfeeding less though still each night, plans to stop soon.     Declines flu and covid-19 due to concerns about breastfeeding. Discussed safety during breastfeeding, though would still like to wait until after she is done breastfeeding    Review of Systems   As above      Objective    /71   Pulse 71   Temp 97.7  F (36.5  C) (Oral)   Resp 20   Wt 64 kg (141 lb)   LMP 10/30/2021 (LMP Unknown)   SpO2 99%   BMI 24.20 kg/m    Body mass index is 24.2 kg/m .  Physical Exam  Constitutional:       General: She is not in acute distress.     Appearance: Normal appearance. She is normal weight. She is not  ill-appearing.   Eyes:      Extraocular Movements: Extraocular movements intact.      Conjunctiva/sclera: Conjunctivae normal.   Pulmonary:      Effort: Pulmonary effort is normal. No respiratory distress.   Abdominal:      General: Abdomen is flat. Bowel sounds are normal. There is no distension.      Palpations: Abdomen is soft. There is no mass.      Tenderness: There is no guarding.      Comments: Mild tenderness to LLQ to deep palpation   Neurological:      Mental Status: She is alert.   Psychiatric:         Mood and Affect: Mood normal.         Behavior: Behavior normal.                 Awake/Alert

## 2024-08-20 VITALS
OXYGEN SATURATION: 98 % | SYSTOLIC BLOOD PRESSURE: 99 MMHG | RESPIRATION RATE: 17 BRPM | TEMPERATURE: 98 F | DIASTOLIC BLOOD PRESSURE: 62 MMHG | HEART RATE: 75 BPM

## 2024-08-20 PROCEDURE — 96374 THER/PROPH/DIAG INJ IV PUSH: CPT | Mod: XU

## 2024-08-20 PROCEDURE — 96376 TX/PRO/DX INJ SAME DRUG ADON: CPT

## 2024-08-20 PROCEDURE — 80048 BASIC METABOLIC PNL TOTAL CA: CPT

## 2024-08-20 PROCEDURE — 99284 EMERGENCY DEPT VISIT MOD MDM: CPT | Mod: 25

## 2024-08-20 PROCEDURE — 76830 TRANSVAGINAL US NON-OB: CPT

## 2024-08-20 PROCEDURE — 86850 RBC ANTIBODY SCREEN: CPT

## 2024-08-20 PROCEDURE — 85025 COMPLETE CBC W/AUTO DIFF WBC: CPT

## 2024-08-20 PROCEDURE — 86900 BLOOD TYPING SEROLOGIC ABO: CPT

## 2024-08-20 PROCEDURE — 36415 COLL VENOUS BLD VENIPUNCTURE: CPT

## 2024-08-20 PROCEDURE — 86901 BLOOD TYPING SEROLOGIC RH(D): CPT

## 2024-08-20 PROCEDURE — 84702 CHORIONIC GONADOTROPIN TEST: CPT

## 2024-08-20 PROCEDURE — 96375 TX/PRO/DX INJ NEW DRUG ADDON: CPT

## 2024-08-20 PROCEDURE — 76856 US EXAM PELVIC COMPLETE: CPT

## 2024-08-20 PROCEDURE — 74177 CT ABD & PELVIS W/CONTRAST: CPT | Mod: MC

## 2024-08-20 PROCEDURE — 96361 HYDRATE IV INFUSION ADD-ON: CPT

## 2024-08-20 RX ORDER — ONDANSETRON HCL/PF 4 MG/2 ML
1 VIAL (ML) INJECTION
Qty: 1 | Refills: 0
Start: 2024-08-20 | End: 2024-08-24

## 2024-08-20 RX ORDER — KETOROLAC TROMETHAMINE 10 MG
15 TABLET ORAL ONCE
Refills: 0 | Status: DISCONTINUED | OUTPATIENT
Start: 2024-08-20 | End: 2024-08-20

## 2024-08-20 RX ORDER — BACTERIOSTATIC SODIUM CHLORIDE 0.9 %
1000 VIAL (ML) INJECTION ONCE
Refills: 0 | Status: COMPLETED | OUTPATIENT
Start: 2024-08-20 | End: 2024-08-20

## 2024-08-20 RX ORDER — BACTERIOSTATIC SODIUM CHLORIDE 0.9 %
500 VIAL (ML) INJECTION ONCE
Refills: 0 | Status: COMPLETED | OUTPATIENT
Start: 2024-08-20 | End: 2024-08-20

## 2024-08-20 RX ADMIN — Medication 1000 MILLILITER(S): at 00:19

## 2024-08-20 RX ADMIN — Medication 15 MILLIGRAM(S): at 00:19

## 2024-08-20 RX ADMIN — Medication 4 MILLIGRAM(S): at 00:19

## 2024-08-20 RX ADMIN — Medication 15 MILLIGRAM(S): at 02:01

## 2024-08-20 RX ADMIN — Medication 1000 MILLILITER(S): at 00:32

## 2024-08-20 RX ADMIN — Medication 1000 MILLILITER(S): at 01:18

## (undated) DEVICE — CATH ANGIOCATH 12G

## (undated) DEVICE — NDL GRASPING DISP

## (undated) DEVICE — ENDOPATH 5MM CVD SCISSOR W MONOPOLAR CAUTERY DISP

## (undated) DEVICE — PACK D&C LNX SURGICOUNT

## (undated) DEVICE — Device

## (undated) DEVICE — GLV 8 PROTEXIS (WHITE)

## (undated) DEVICE — ENDOCATCH II 15MM

## (undated) DEVICE — DRAPE LIGHT HANDLE COVER (BLUE)

## (undated) DEVICE — SUT SILK 0 18" TIES

## (undated) DEVICE — SUT MONOCRYL 2-0 27" SH VIOLET

## (undated) DEVICE — SUT VICRYL 1 18" CT-1 UNDYED (POP-OFF)

## (undated) DEVICE — PACK GENERAL CLOSING

## (undated) DEVICE — DRAPE 3/4 SHEET 52X76"

## (undated) DEVICE — PACK GYN WDC

## (undated) DEVICE — LIGASURE MARYLAND 37CM

## (undated) DEVICE — ENDO LOOP LINA GOLD LOOP FOR LSH 200MM X 100MM LG CONNECTOR

## (undated) DEVICE — D HELP - CLEARVIEW CLEARIFY SYSTEM

## (undated) DEVICE — TUBING AQUILEX OUTFLOW

## (undated) DEVICE — ENDOCATCH 10MM SPECIMEN POUCH

## (undated) DEVICE — PACK DEL VAG LNX SURGICOUNT

## (undated) DEVICE — TUBING RANGER FLUID IRRIGATION SET DISP

## (undated) DEVICE — DRAINAGE BAG URINARY 4000CC

## (undated) DEVICE — ELCTR CUTTING LOOP FOR PRINCESS RESECTOSCOPE 21FR

## (undated) DEVICE — GLV 6.5 PROTEXIS (WHITE)

## (undated) DEVICE — INSUFFLATION NDL COVIDIEN SURGINEEDLE VERESS 120MM

## (undated) DEVICE — RAYTEC SPONGE 4X4 16PLY

## (undated) DEVICE — FOLEY TRAY 16FR 5CC LF UMETER CLOSED

## (undated) DEVICE — TUBING TUR 2 PRONG

## (undated) DEVICE — BLADE SURGICAL #10 CARBON

## (undated) DEVICE — SUT MAXON 0 30" HGU-46

## (undated) DEVICE — BLADE SURGICAL #12 CARBON STEEL

## (undated) DEVICE — TROCAR ETHICON ENDOPATH XCEL BLADELESS 11MM X 100MM STABILITY

## (undated) DEVICE — SUT PDS II 0 27" CT-1

## (undated) DEVICE — TROCAR ETHICON ENDOPATH MINI BLADELESS 2/3MM X 100MM SMOOTH

## (undated) DEVICE — PRESSURE INFUSOR BAG 3000ML

## (undated) DEVICE — SHEARS HARMONIC ACE 5MM X 36CM CURVED TIP

## (undated) DEVICE — PREP CHLORAPREP HI-LITE ORANGE 26ML

## (undated) DEVICE — POSITIONER FOAM EGG CRATE ULNAR 2PCS (PINK)

## (undated) DEVICE — DRSG STERISTRIPS 0.5 X 4"

## (undated) DEVICE — POSITIONER PINK PAD PIGAZZI SYSTEM XL W ARM PROTECTOR

## (undated) DEVICE — SUT VICRYL 2-0 27" SH

## (undated) DEVICE — FOLEY TRAY 14FR 5CC LF UMETER CLOSED

## (undated) DEVICE — DRAPE STERI-DRAPE INCISE 32X33"

## (undated) DEVICE — DRAPE TOWEL BLUE 17" X 24"

## (undated) DEVICE — TROCAR COOPER SURGICAL PYRAMIDAL TIP 5X10.16

## (undated) DEVICE — SUT ETHIBOND 0 30" CT-2

## (undated) DEVICE — VENODYNE/SCD SLEEVE CALF MEDIUM

## (undated) DEVICE — DRAPE SURGICAL #1010

## (undated) DEVICE — SUT VICRYL PLUS 0 36" CT-1

## (undated) DEVICE — TUBING STRYKER ARTHROSCOPY INFLOW

## (undated) DEVICE — DRSG GAUZE VASELINE PETROLEUM 3 X 9"

## (undated) DEVICE — SOL IRR BAG NS 0.9% 3000ML

## (undated) DEVICE — SUT MONOCRYL 4-0 27" PS-2 UNDYED

## (undated) DEVICE — SUT VICRYL 0 27" UR-6

## (undated) DEVICE — TUBING STRYKER PNEUMOCLEAR SMOKE EVACUATION HIGH FLOW

## (undated) DEVICE — ELCTR THUNDERBEAT HANDPIECE 5MM X 35CM FRONT CONTROL

## (undated) DEVICE — TROCAR COOPER SURGICAL LAPAROSCOPIC CANNULA 5MM

## (undated) DEVICE — WARMING BLANKET UPPER ADULT